# Patient Record
Sex: MALE
[De-identification: names, ages, dates, MRNs, and addresses within clinical notes are randomized per-mention and may not be internally consistent; named-entity substitution may affect disease eponyms.]

---

## 2024-08-29 PROBLEM — Z00.00 ENCOUNTER FOR PREVENTIVE HEALTH EXAMINATION: Status: ACTIVE | Noted: 2024-08-29

## 2024-09-16 ENCOUNTER — APPOINTMENT (OUTPATIENT)
Dept: SURGERY | Facility: CLINIC | Age: 59
End: 2024-09-16
Payer: COMMERCIAL

## 2024-09-16 VITALS
HEART RATE: 75 BPM | HEIGHT: 72 IN | SYSTOLIC BLOOD PRESSURE: 122 MMHG | WEIGHT: 208 LBS | BODY MASS INDEX: 28.17 KG/M2 | OXYGEN SATURATION: 95 % | DIASTOLIC BLOOD PRESSURE: 82 MMHG

## 2024-09-16 DIAGNOSIS — Z78.9 OTHER SPECIFIED HEALTH STATUS: ICD-10-CM

## 2024-09-16 DIAGNOSIS — K82.8 OTHER SPECIFIED DISEASES OF GALLBLADDER: ICD-10-CM

## 2024-09-16 PROCEDURE — 99204 OFFICE O/P NEW MOD 45 MIN: CPT | Mod: 57

## 2024-09-16 RX ORDER — MIRTAZAPINE 30 MG/1
30 TABLET, FILM COATED ORAL
Qty: 90 | Refills: 0 | Status: ACTIVE | COMMUNITY
Start: 2024-08-08

## 2024-09-16 RX ORDER — DICYCLOMINE HYDROCHLORIDE 20 MG/1
20 TABLET ORAL
Qty: 30 | Refills: 0 | Status: ACTIVE | COMMUNITY
Start: 2024-09-16 | End: 1900-01-01

## 2024-09-16 RX ORDER — LAMOTRIGINE 100 MG/1
100 TABLET ORAL
Qty: 30 | Refills: 0 | Status: ACTIVE | COMMUNITY
Start: 2024-06-18

## 2024-09-16 RX ORDER — BUPROPION HYDROCHLORIDE 150 MG/1
150 TABLET, EXTENDED RELEASE ORAL
Qty: 60 | Refills: 0 | Status: ACTIVE | COMMUNITY
Start: 2024-09-05

## 2024-09-16 NOTE — PHYSICAL EXAM
[Normal Thyroid] : the thyroid was normal [Normal Breath Sounds] : Normal breath sounds [Normal Heart Sounds] : normal heart sounds [Normal Rate and Rhythm] : normal rate and rhythm [No Rash or Lesion] : No rash or lesion [Alert] : alert [Oriented to Person] : oriented to person [Oriented to Place] : oriented to place [Oriented to Time] : oriented to time [Calm] : calm [JVD] : no jugular venous distention  [Carotid Bruits] : no carotid bruits [de-identified] : well developed white male in no distress [de-identified] : non icteric [de-identified] : without adenopathy [de-identified] : normal bowel sounds, with RUQ tenderness, without guarding or rebound tenderness.  supra umbilical scar from hernia repair. [de-identified] : without hernia. [de-identified] : without calf pain or swelling

## 2024-09-16 NOTE — ASSESSMENT
[FreeTextEntry1] : I have discussed with pt that he should remain on a low fat diet to avoid future attacks. I have also given him a script to  Bentyl to take during attack. I have also explained to him that not all pt improve after gallbladder surgery and I would like to make sure he does not have any stomach issues. I have also placed a call to Dr Thomas to request he do an EGD before any planned gallbladder surgery.

## 2024-09-16 NOTE — HISTORY OF PRESENT ILLNESS
[de-identified] : biliary dyskinesia  [de-identified] : 59 year old white male who presents c/o episodes of RUQ pain. Pt states he was in his usual states of good health until 7 months ago when he began having RUQ pain. His pain was localized and did not radiate. It increased with fatty foods, He denies alleviating factors. He did have some nausea and vomiting. He denies any changes in his bowel habits. He denies any fevers or chills. He did not turn jaundices. He does have a mother who had gallbladder disease.

## 2024-09-16 NOTE — PRE-SURGICAL ASSESSMENT
[Telehealth PST] : Telehealth PST (low patient risk, low-intermediate procedural risk) [FreeTextEntry1] : robotic assisted laparoscopic cholecystectomy

## 2024-09-16 NOTE — HISTORY OF PRESENT ILLNESS
[de-identified] : biliary dyskinesia  [de-identified] : 59 year old white male who presents c/o episodes of RUQ pain. Pt states he was in his usual states of good health until 7 months ago when he began having RUQ pain. His pain was localized and did not radiate. It increased with fatty foods, He denies alleviating factors. He did have some nausea and vomiting. He denies any changes in his bowel habits. He denies any fevers or chills. He did not turn jaundices. He does have a mother who had gallbladder disease.

## 2024-09-16 NOTE — REVIEW OF SYSTEMS
[Abdominal Pain] : abdominal pain [Vomiting] : vomiting [Heartburn] : heartburn [Anxiety] : anxiety [Depression] : depression [Negative] : Heme/Lymph [Constipation] : no constipation [Diarrhea] : no diarrhea [Melena] : no melena [Suicidal] : not suicidal [Sleep Disturbances] : no sleep disturbances [Change In Personality] : no personality change [Emotional Problems] : no emotional problems [FreeTextEntry9] : with back pain

## 2024-09-16 NOTE — PHYSICAL EXAM
[Normal Thyroid] : the thyroid was normal [Normal Breath Sounds] : Normal breath sounds [Normal Heart Sounds] : normal heart sounds [Normal Rate and Rhythm] : normal rate and rhythm [No Rash or Lesion] : No rash or lesion [Alert] : alert [Oriented to Person] : oriented to person [Oriented to Place] : oriented to place [Oriented to Time] : oriented to time [Calm] : calm [JVD] : no jugular venous distention  [Carotid Bruits] : no carotid bruits [de-identified] : well developed white male in no distress [de-identified] : non icteric [de-identified] : without adenopathy [de-identified] : normal bowel sounds, with RUQ tenderness, without guarding or rebound tenderness.  supra umbilical scar from hernia repair. [de-identified] : without hernia. [de-identified] : without calf pain or swelling

## 2024-11-20 ENCOUNTER — APPOINTMENT (OUTPATIENT)
Dept: GASTROENTEROLOGY | Facility: CLINIC | Age: 59
End: 2024-11-20